# Patient Record
(demographics unavailable — no encounter records)

---

## 2024-10-16 NOTE — REVIEW OF SYSTEMS
[Visual Disturbances] : visual disturbances [Negative] : Neurological [Peripheral Sensory Neuropathy: Grade 1 - Asymptomatic; loss of deep tendon reflexes or paresthesia] : Peripheral Sensory Neuropathy: Grade 1 - Asymptomatic; loss of deep tendon reflexes or paresthesia [Dysphagia] : no dysphagia [FreeTextEntry3] : with macular degeneration RIGHT eye vision loss. Left eye vision intact [FreeTextEntry4] : denies neck swelling/arm swelling [de-identified] : Hands

## 2024-10-16 NOTE — HISTORY OF PRESENT ILLNESS
[FreeTextEntry1] : Rt hx: Ms. Christine underwent SRS to a left frontal brain metastasis on 1/12/17, gamma knife SRS on 10/25/17 to 4 lesions, and most recently left parietal and left thalamic lesion on 1/7/2021.    ONCOLOGY HISTORY Ms. 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 She underwent 45 Gy of radiation to the chest completed in January 2017. She underwent SRS to a left frontal brain metastasis on 1/12/17 and additional gamma knife SRS on 10/25/17 to 4 other brain metastases. She followed up on 12/20/17 and was doing well after a follow-up MRI on 12/11/17 demonstrated a good response with no new lesions.     In January 2021 she was seen by me for suspicion of two new brain metastases, asymptomatic.  There was concern that these could represent leptomeningeal enhancement.  At the time of initial consult she felt overall very well. reported a stress level "0/10." denied pain, denied headaches, nausea, vomiting focal weakness, numbness, tingling, seizures, balance trouble. She had not been on systemic therapy in 3 years. Followed regularly with Dr. Blake for brain MRIs every 6 months, and with Dr. Kumar at [facility] for body scans. These were treated with GK SRS.   4/7/2021- Ms. Christine presents today for follow up. She underwent a brain MRI prior to her visit today. There is no final read however there does not appear to be any new lesions and the treated lesions appear smaller. She feels overall well. Denies headaches, focal weakness, nausea, vomiting. Remains not on any systemic therapy.  8/9/2021- Ms. Christine presents today for follow up.  Clinically she is doing well, denying any new symptoms.  Brain MRI done 8/7/2021 showed resolution of the left internal capsule lesion and decrease in size of the left parietal cortex lesion.  3/1/2022: Pt presents for follow-up. MRI brain 2/25 with resolution of left parietal cortex lesion, no new mets. Doing well. Continues Keytruda. Notes mild numbness bilateral hands in mornings, goes away. She has been sleeping on her hands. Denies HA, N/V/D. Denies changes in hearing or speech. Denies dysphagia. H/o cataracts, seeing eye doctor next week. Having near-vision issues lately. No gait issues.   7/12/2022- Ms. Christine presents today for follow up. MRI done this morning. feeling well. started keytruda back in 11/2021 for recurrence in chest under the care of Dr. Bee. No headaches, no nausea, no focal weakness. has macular degeneration and following with ophtho.  11/22/2022- Ms. Page presents today for follow up. MRI done before visit today. PET scan 9/20/2022 showed Compared to FDG-PET/CT scan dated 5/9/2022: 1. Minimally FDG-avid right lower lobe lung nodule and right perihilar lymph nodes, not significantly changed. Small focus of residual disease is not excluded. 2. Remainder of study demonstrates no evidence of FDG-avid disease and no significant change. No new lesion.  Today she feels well overall. continues to follow with Dr. Bee. No headaches, no nausea, no focal weakness. on keytruda every 3 weeks.   3/15/2023 Ms. Christine presents for follow up. Brain MRI done today. HUNTER on our read. PET/CT on 3/9/2023 showed IMPRESSION: 1. No significant interval change. No new lesions. Stable scan compared to prior.  Visit dated 10/5/2023 patient presents for routine f/u and progress check with completed images for review. reports doing well. Reports doing well appetite is good and is w/o any swallowing difficulty. Denies N/V, HA/unilateral extremity weakness/memory changes/gait disturbance/bowel/bladder dysfunction or other neurologic symptoms. No issues with speech or comprehension. Vision loss RIGHT eye vision loss 2/2 macular degeneration  Continues follow with Dr Heather Santana on Keytruda  MRI brain w w/o contrast 10/4/2023 no final read available at time of this entry.  PETCt 9/22/23 IMPRESSION: Compared to FDG PET/CT dated 3/9/2023: 1. Two indeterminate small foci of mildly increased FDG activity in right perihilar region, likely corresponding to small lymph nodes, are similar, or minimally increased in metabolism. Consider 3 month follow-up with FDG PET/CT, for further evaluation. 2. Minimally FDG-avid right lower lobe nodular density, not significantly changed. 3. Small, minimally FDG-avid subcutaneous density, lateral aspect of upper right arm, decreased in size and metabolism, may be related to prior subcutaneous injection. Please correlate clinically, and with ultrasound, if indicated.   Visit dated 4/10/24 Patient returns for routine f/u and progress check with cranial images for review. Denies N/V, HA/unilateral extremity weakness/memory changes/gait disturbance/bowel/bladder dysfunction or other neurologic symptoms. No issues with speech or comprehension. Participates in activity as tolerated.  Continues follow with Dr. Fidel Santana on Keytruda every 3 weeks tolerating well.  MRI brain w w/o contrast 4/10/24 no final read available at time of this entry  PET ct 1/25/2024 IMPRESSION: Compared to FDG PET/CT dated 9/22/2023: 1. Two unchanged small FDG-avid foci in right perihilar region, likely corresponding to small lymph nodes which are nonspecific. 2. Minimally FDG-avid partially calcified right lower lobe pulmonary nodule is unchanged in size and metabolism. 3. Small FDG-avid intramuscular focus, right upper arm, not significantly changed. Please correlate clinically. 4. No new lesion.  VISIT DATED: 10/16/2024 Patient returns for routine f/u and progress eval with cranial images for review Reports doing well overall. Denies N+V, Headaches unilateral extremity weakness/memory changes/gait disturbance/bowel/bladder dysfunction or other neurologic symptoms. No issues with speech or comprehension. Baseline visual disturbance RIGHT 2/2 macular degen.  Continues follow with Dr. Fidel Santana on Keytruda every 3 weeks tolerating well. Per patient last PET scan ~ 2 months ago w/o POD.  MRI brain w w/o contrast 10/16/2024 no final read at time of this entry

## 2024-10-16 NOTE — HISTORY OF PRESENT ILLNESS
[FreeTextEntry1] : Rt hx: Ms. Christine underwent SRS to a left frontal brain metastasis on 1/12/17, gamma knife SRS on 10/25/17 to 4 lesions, and most recently left parietal and left thalamic lesion on 1/7/2021.    ONCOLOGY HISTORY Ms. 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 She underwent 45 Gy of radiation to the chest completed in January 2017. She underwent SRS to a left frontal brain metastasis on 1/12/17 and additional gamma knife SRS on 10/25/17 to 4 other brain metastases. She followed up on 12/20/17 and was doing well after a follow-up MRI on 12/11/17 demonstrated a good response with no new lesions.     In January 2021 she was seen by me for suspicion of two new brain metastases, asymptomatic.  There was concern that these could represent leptomeningeal enhancement.  At the time of initial consult she felt overall very well. reported a stress level "0/10." denied pain, denied headaches, nausea, vomiting focal weakness, numbness, tingling, seizures, balance trouble. She had not been on systemic therapy in 3 years. Followed regularly with Dr. Blake for brain MRIs every 6 months, and with Dr. Kumar at [facility] for body scans. These were treated with GK SRS.   4/7/2021- Ms. Christine presents today for follow up. She underwent a brain MRI prior to her visit today. There is no final read however there does not appear to be any new lesions and the treated lesions appear smaller. She feels overall well. Denies headaches, focal weakness, nausea, vomiting. Remains not on any systemic therapy.  8/9/2021- Ms. Christine presents today for follow up.  Clinically she is doing well, denying any new symptoms.  Brain MRI done 8/7/2021 showed resolution of the left internal capsule lesion and decrease in size of the left parietal cortex lesion.  3/1/2022: Pt presents for follow-up. MRI brain 2/25 with resolution of left parietal cortex lesion, no new mets. Doing well. Continues Keytruda. Notes mild numbness bilateral hands in mornings, goes away. She has been sleeping on her hands. Denies HA, N/V/D. Denies changes in hearing or speech. Denies dysphagia. H/o cataracts, seeing eye doctor next week. Having near-vision issues lately. No gait issues.   7/12/2022- Ms. Christine presents today for follow up. MRI done this morning. feeling well. started keytruda back in 11/2021 for recurrence in chest under the care of Dr. Bee. No headaches, no nausea, no focal weakness. has macular degeneration and following with ophtho.  11/22/2022- Ms. Page presents today for follow up. MRI done before visit today. PET scan 9/20/2022 showed Compared to FDG-PET/CT scan dated 5/9/2022: 1. Minimally FDG-avid right lower lobe lung nodule and right perihilar lymph nodes, not significantly changed. Small focus of residual disease is not excluded. 2. Remainder of study demonstrates no evidence of FDG-avid disease and no significant change. No new lesion.  Today she feels well overall. continues to follow with Dr. Bee. No headaches, no nausea, no focal weakness. on keytruda every 3 weeks.   3/15/2023 Ms. Christine presents for follow up. Brain MRI done today. HUNTER on our read. PET/CT on 3/9/2023 showed IMPRESSION: 1. No significant interval change. No new lesions. Stable scan compared to prior.  Visit dated 10/5/2023 patient presents for routine f/u and progress check with completed images for review. reports doing well. Reports doing well appetite is good and is w/o any swallowing difficulty. Denies N/V, HA/unilateral extremity weakness/memory changes/gait disturbance/bowel/bladder dysfunction or other neurologic symptoms. No issues with speech or comprehension. Vision loss RIGHT eye vision loss 2/2 macular degeneration  Continues follow with Dr Heather Santana on Keytruda  MRI brain w w/o contrast 10/4/2023 no final read available at time of this entry.  PETCt 9/22/23 IMPRESSION: Compared to FDG PET/CT dated 3/9/2023: 1. Two indeterminate small foci of mildly increased FDG activity in right perihilar region, likely corresponding to small lymph nodes, are similar, or minimally increased in metabolism. Consider 3 month follow-up with FDG PET/CT, for further evaluation. 2. Minimally FDG-avid right lower lobe nodular density, not significantly changed. 3. Small, minimally FDG-avid subcutaneous density, lateral aspect of upper right arm, decreased in size and metabolism, may be related to prior subcutaneous injection. Please correlate clinically, and with ultrasound, if indicated.   Visit dated 4/10/24 Patient returns for routine f/u and progress check with cranial images for review. Denies N/V, HA/unilateral extremity weakness/memory changes/gait disturbance/bowel/bladder dysfunction or other neurologic symptoms. No issues with speech or comprehension. Participates in activity as tolerated.  Continues follow with Dr. Fidel Santana on Keytruda every 3 weeks tolerating well.  MRI brain w w/o contrast 4/10/24 no final read available at time of this entry  PET ct 1/25/2024 IMPRESSION: Compared to FDG PET/CT dated 9/22/2023: 1. Two unchanged small FDG-avid foci in right perihilar region, likely corresponding to small lymph nodes which are nonspecific. 2. Minimally FDG-avid partially calcified right lower lobe pulmonary nodule is unchanged in size and metabolism. 3. Small FDG-avid intramuscular focus, right upper arm, not significantly changed. Please correlate clinically. 4. No new lesion.  VISIT DATED: 10/16/2024 Patient returns for routine f/u and progress eval with cranial images for review Reports doing well overall. Denies N+V, Headaches unilateral extremity weakness/memory changes/gait disturbance/bowel/bladder dysfunction or other neurologic symptoms. No issues with speech or comprehension. Baseline visual disturbance RIGHT 2/2 macular degen.  Continues follow with Dr. Fidel Santana on Keytruda every 3 weeks tolerating well. Per patient last PET scan ~ 2 months ago w/o POD.  MRI brain w w/o contrast 10/16/2024 no final read at time of this entry

## 2024-10-16 NOTE — PHYSICAL EXAM
[General Appearance - Well Nourished] : well nourished [Thin] : thin [Hearing Threshold Finger Rub Not [place]] : hearing was normal [] : no respiratory distress [Exaggerated Use Of Accessory Muscles For Inspiration] : no accessory muscle use [No Focal Deficits] : no focal deficits [Sensation] : the sensory exam was normal to light touch and pinprick [Motor Exam] : the motor exam was normal [Oriented To Time, Place, And Person] : oriented to person, place, and time [de-identified] : no neuro deficits

## 2024-10-16 NOTE — REVIEW OF SYSTEMS
[Visual Disturbances] : visual disturbances [Negative] : Neurological [Peripheral Sensory Neuropathy: Grade 1 - Asymptomatic; loss of deep tendon reflexes or paresthesia] : Peripheral Sensory Neuropathy: Grade 1 - Asymptomatic; loss of deep tendon reflexes or paresthesia [Dysphagia] : no dysphagia [FreeTextEntry3] : with macular degeneration RIGHT eye vision loss. Left eye vision intact [FreeTextEntry4] : denies neck swelling/arm swelling [de-identified] : Hands

## 2024-10-16 NOTE — PHYSICAL EXAM
[General Appearance - Well Nourished] : well nourished [Thin] : thin [Hearing Threshold Finger Rub Not [place]] : hearing was normal [] : no respiratory distress [Exaggerated Use Of Accessory Muscles For Inspiration] : no accessory muscle use [No Focal Deficits] : no focal deficits [Sensation] : the sensory exam was normal to light touch and pinprick [Motor Exam] : the motor exam was normal [Oriented To Time, Place, And Person] : oriented to person, place, and time [de-identified] : no neuro deficits

## 2024-10-16 NOTE — VITALS
[ECOG Performance Status: 0 - Fully active, able to carry on all pre-disease performance without restriction] : Performance Status: 0 - Fully active, able to carry on all pre-disease performance without restriction [Maximal Pain Intensity: 0/10] : 0/10 [Least Pain Intensity: 0/10] : 0/10 [80: Normal activity with effort; some signs or symptoms of disease.] : 80: Normal activity with effort; some signs or symptoms of disease.

## 2024-11-26 NOTE — PAST MEDICAL HISTORY
[Malignancy] : Malignancy [No therapy indicated for cases scheduled for less than one hour] : No therapy indicated for cases scheduled for less than one hour. [FreeTextEntry1] : Malignant Hyperthermia Screening Tool and Risk of Bleeding Assessment  Ms. HANSA VARGAS denies family history of unexpected death following Anesthesia or Exercise. Denies Family history of Malignant Hyperthermia, Muscle or Neuromuscular disorder and High Temperature following exercise.  Ms. HANSA VARGAS denies history of Muscle Spasm, Dark or Chocolate - Colored urine and Unanticipated fever immediately following anesthesia or serious exercise.  Ms. VARGAS also denies bleeding tendencies/ Risks of Bleeding.

## 2024-11-26 NOTE — PROCEDURE
[D/C IV on discharge] : D/C IV on discharge [Resume diet] : resume diet [Site check for bleeding/hematoma] : Site check for bleeding/hematoma [Vital signs on admission the q 15 mins x2] : Vital signs on admission the q 15 mins x2 [FreeTextEntry1] : mediport check

## 2024-11-27 NOTE — HISTORY OF PRESENT ILLNESS
[FreeTextEntry1] : accompanied by  Sha [phone] alert  pt forgetful  no medications taken this morning  oncologist Dr Romero   [FreeTextEntry5] : yesterday at 7pm  [FreeTextEntry6] : Dr Acevedo

## 2024-11-27 NOTE — PROCEDURE
[D/C IV on discharge] : D/C IV on discharge [Resume diet] : resume diet [Discharged at: ____] : Discharged at [unfilled] [Site check for bleeding/hematoma] : Site check for bleeding/hematoma [Vital signs on admission the q 15 mins x2] : Vital signs on admission the q 15 mins x2 [FreeTextEntry1] : Right femoral mediport exchange

## 2024-11-27 NOTE — HISTORY OF PRESENT ILLNESS
[FreeTextEntry1] : accompanied by  Sha [phone] alert  pt forgetful  no medications taken this morning  oncologist Dr Romero   [FreeTextEntry5] : yesterday at 7pm  [FreeTextEntry6] : Dr Acevedo

## 2024-11-27 NOTE — HISTORY OF PRESENT ILLNESS
[FreeTextEntry1] : accompanied by  Sha [phone] alert  pt forgetful  no medications taken this morning  oncologist Dr Romero   [FreeTextEntry5] : yesterday at 7pm  [FreeTextEntry6] : Dr Acevedo

## 2024-11-27 NOTE — PAST MEDICAL HISTORY
[Increasing age ( >40 years old)] : Increasing age ( >40 years old) [Malignancy] : Malignancy [No therapy indicated for cases scheduled for less than one hour] : No therapy indicated for cases scheduled for less than one hour. [FreeTextEntry1] : Malignant Hyperthermia Screening Tool and Risk of Bleeding Assessment   HANSA VARGAS  denies family of unexpected death following Anesthesia or Exercise. Denies family history of Malignant Hyperthermia, Muscle or Neuromuscular disorder and High Temperature following exercise.   HANSA SALASRSKI Patient denies history of Muscle Spasm, Dark or Chocolate- Colored and unanticipated fever immediately following anesthesia or serious exercise.   HANSA SALASRSKI Patient also denies bleeding tendencies/risks of bleeding.

## 2025-04-14 NOTE — HISTORY OF PRESENT ILLNESS
[FreeTextEntry1] : RT hx: Ms. Christine underwent SRS to a left frontal brain metastasis on 1/12/17, gamma knife SRS on 10/25/17 to 4 lesions, and most recently left parietal and left thalamic lesion on 1/7/2021.    ONCOLOGY HISTORY Ms. 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 She underwent 45 Gy of radiation to the chest completed in January 2017. She underwent SRS to a left frontal brain metastasis on 1/12/17 and additional gamma knife SRS on 10/25/17 to 4 other brain metastases. She followed up on 12/20/17 and was doing well after a follow-up MRI on 12/11/17 demonstrated a good response with no new lesions.     In January 2021 she was seen by me for suspicion of two new brain metastases, asymptomatic.  There was concern that these could represent leptomeningeal enhancement.  At the time of initial consult she felt overall very well. reported a stress level "0/10." denied pain, denied headaches, nausea, vomiting focal weakness, numbness, tingling, seizures, balance trouble. She had not been on systemic therapy in 3 years. Followed regularly with Dr. Blake for brain MRIs every 6 months, and with Dr. Kumar at [facility] for body scans. These were treated with GK SRS.   4/7/2021- Ms. Christine presents today for follow up. She underwent a brain MRI prior to her visit today. There is no final read however there does not appear to be any new lesions and the treated lesions appear smaller. She feels overall well. Denies headaches, focal weakness, nausea, vomiting. Remains not on any systemic therapy.  8/9/2021- Ms. Christine presents today for follow up.  Clinically she is doing well, denying any new symptoms.  Brain MRI done 8/7/2021 showed resolution of the left internal capsule lesion and decrease in size of the left parietal cortex lesion.  3/1/2022: Pt presents for follow-up. MRI brain 2/25 with resolution of left parietal cortex lesion, no new mets. Doing well. Continues Keytruda. Notes mild numbness bilateral hands in mornings, goes away. She has been sleeping on her hands. Denies HA, N/V/D. Denies changes in hearing or speech. Denies dysphagia. H/o cataracts, seeing eye doctor next week. Having near-vision issues lately. No gait issues.   7/12/2022- Ms. Christine presents today for follow up. MRI done this morning. feeling well. started keytruda back in 11/2021 for recurrence in chest under the care of Dr. Bee. No headaches, no nausea, no focal weakness. has macular degeneration and following with ophtho.  11/22/2022- Ms. Page presents today for follow up. MRI done before visit today. PET scan 9/20/2022 showed Compared to FDG-PET/CT scan dated 5/9/2022: 1. Minimally FDG-avid right lower lobe lung nodule and right perihilar lymph nodes, not significantly changed. Small focus of residual disease is not excluded. 2. Remainder of study demonstrates no evidence of FDG-avid disease and no significant change. No new lesion.  Today she feels well overall. continues to follow with Dr. Bee. No headaches, no nausea, no focal weakness. on keytruda every 3 weeks.   3/15/2023 Ms. Christine presents for follow up. Brain MRI done today. HUNTER on our read. PET/CT on 3/9/2023 showed IMPRESSION: 1. No significant interval change. No new lesions. Stable scan compared to prior.  Visit dated 10/5/2023 patient presents for routine f/u and progress check with completed images for review. reports doing well. Reports doing well appetite is good and is w/o any swallowing difficulty. Denies N/V, HA/unilateral extremity weakness/memory changes/gait disturbance/bowel/bladder dysfunction or other neurologic symptoms. No issues with speech or comprehension. Vision loss RIGHT eye vision loss 2/2 macular degeneration  Continues follow with Dr Heather Santana on Keytruda  MRI brain w w/o contrast 10/4/2023 no final read available at time of this entry.  PETCt 9/22/23 IMPRESSION: Compared to FDG PET/CT dated 3/9/2023: 1. Two indeterminate small foci of mildly increased FDG activity in right perihilar region, likely corresponding to small lymph nodes, are similar, or minimally increased in metabolism. Consider 3 month follow-up with FDG PET/CT, for further evaluation. 2. Minimally FDG-avid right lower lobe nodular density, not significantly changed. 3. Small, minimally FDG-avid subcutaneous density, lateral aspect of upper right arm, decreased in size and metabolism, may be related to prior subcutaneous injection. Please correlate clinically, and with ultrasound, if indicated.   Visit dated 4/10/24 Patient returns for routine f/u and progress check with cranial images for review. Denies N/V, HA/unilateral extremity weakness/memory changes/gait disturbance/bowel/bladder dysfunction or other neurologic symptoms. No issues with speech or comprehension. Participates in activity as tolerated.  Continues follow with Dr. Fidel Santana on Keytruda every 3 weeks tolerating well.  MRI brain w w/o contrast 4/10/24 no final read available at time of this entry  PET ct 1/25/2024 IMPRESSION: Compared to FDG PET/CT dated 9/22/2023: 1. Two unchanged small FDG-avid foci in right perihilar region, likely corresponding to small lymph nodes which are nonspecific. 2. Minimally FDG-avid partially calcified right lower lobe pulmonary nodule is unchanged in size and metabolism. 3. Small FDG-avid intramuscular focus, right upper arm, not significantly changed. Please correlate clinically. 4. No new lesion.  VISIT DATED: 10/16/2024 Patient returns for routine f/u and progress eval with cranial images for review Reports doing well overall. Denies N+V, Headaches unilateral extremity weakness/memory changes/gait disturbance/bowel/bladder dysfunction or other neurologic symptoms. No issues with speech or comprehension. Baseline visual disturbance RIGHT 2/2 macular degen.  Continues follow with Dr. Fidel Santana on Keytruda every 3 weeks tolerating well. Per patient last PET scan ~ 2 months ago w/o POD.  MRI brain w w/o contrast 10/16/2024 no final read at time of this entry  VISIT DATED: 4/1/62025 Ms. Christine presents for routine followup and progress check with cranial images for review. States  Continues follow with Dr. Fidel Santana on Keytruda every 3 weeks tolerating well.???  MRI brain w w/o contrast 4/11/2025 ????

## 2025-04-16 NOTE — REVIEW OF SYSTEMS
[Visual Disturbances] : visual disturbances [Dysphagia] : no dysphagia [Negative] : Neurological [FreeTextEntry3] : with macular degeneration RIGHT eye vision loss. Left eye vision intact [FreeTextEntry4] : denies neck swelling/arm swelling

## 2025-04-16 NOTE — HISTORY OF PRESENT ILLNESS
[FreeTextEntry1] : RT hx: Ms. Christine underwent SRS to a left frontal brain metastasis on 1/12/17, gamma knife SRS on 10/25/17 to 4 lesions, and most recently left parietal and left thalamic lesion on 1/7/2021.    ONCOLOGY HISTORY Ms. 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 She underwent 45 Gy of radiation to the chest completed in January 2017. She underwent SRS to a left frontal brain metastasis on 1/12/17 and additional gamma knife SRS on 10/25/17 to 4 other brain metastases. She followed up on 12/20/17 and was doing well after a follow-up MRI on 12/11/17 demonstrated a good response with no new lesions.     In January 2021 she was seen by me for suspicion of two new brain metastases, asymptomatic.  There was concern that these could represent leptomeningeal enhancement.  At the time of initial consult she felt overall very well. reported a stress level "0/10." denied pain, denied headaches, nausea, vomiting focal weakness, numbness, tingling, seizures, balance trouble. She had not been on systemic therapy in 3 years. Followed regularly with Dr. Blake for brain MRIs every 6 months, and with Dr. Kumar at [facility] for body scans. These were treated with GK SRS.   4/7/2021- Ms. Christine presents today for follow up. She underwent a brain MRI prior to her visit today. There is no final read however there does not appear to be any new lesions and the treated lesions appear smaller. She feels overall well. Denies headaches, focal weakness, nausea, vomiting. Remains not on any systemic therapy.  8/9/2021- Ms. Christine presents today for follow up.  Clinically she is doing well, denying any new symptoms.  Brain MRI done 8/7/2021 showed resolution of the left internal capsule lesion and decrease in size of the left parietal cortex lesion.  3/1/2022: Pt presents for follow-up. MRI brain 2/25 with resolution of left parietal cortex lesion, no new mets. Doing well. Continues Keytruda. Notes mild numbness bilateral hands in mornings, goes away. She has been sleeping on her hands. Denies HA, N/V/D. Denies changes in hearing or speech. Denies dysphagia. H/o cataracts, seeing eye doctor next week. Having near-vision issues lately. No gait issues.   7/12/2022- Ms. Christine presents today for follow up. MRI done this morning. feeling well. started keytruda back in 11/2021 for recurrence in chest under the care of Dr. Bee. No headaches, no nausea, no focal weakness. has macular degeneration and following with ophtho.  11/22/2022- Ms. Page presents today for follow up. MRI done before visit today. PET scan 9/20/2022 showed Compared to FDG-PET/CT scan dated 5/9/2022: 1. Minimally FDG-avid right lower lobe lung nodule and right perihilar lymph nodes, not significantly changed. Small focus of residual disease is not excluded. 2. Remainder of study demonstrates no evidence of FDG-avid disease and no significant change. No new lesion.  Today she feels well overall. continues to follow with Dr. Bee. No headaches, no nausea, no focal weakness. on keytruda every 3 weeks.   3/15/2023 Ms. Christine presents for follow up. Brain MRI done today. HUNTER on our read. PET/CT on 3/9/2023 showed IMPRESSION: 1. No significant interval change. No new lesions. Stable scan compared to prior.  Visit dated 10/5/2023 patient presents for routine f/u and progress check with completed images for review. reports doing well. Reports doing well appetite is good and is w/o any swallowing difficulty. Denies N/V, HA/unilateral extremity weakness/memory changes/gait disturbance/bowel/bladder dysfunction or other neurologic symptoms. No issues with speech or comprehension. Vision loss RIGHT eye vision loss 2/2 macular degeneration  Continues follow with Dr Heather Santana on Keytruda  MRI brain w w/o contrast 10/4/2023 no final read available at time of this entry.  PETCt 9/22/23 IMPRESSION: Compared to FDG PET/CT dated 3/9/2023: 1. Two indeterminate small foci of mildly increased FDG activity in right perihilar region, likely corresponding to small lymph nodes, are similar, or minimally increased in metabolism. Consider 3 month follow-up with FDG PET/CT, for further evaluation. 2. Minimally FDG-avid right lower lobe nodular density, not significantly changed. 3. Small, minimally FDG-avid subcutaneous density, lateral aspect of upper right arm, decreased in size and metabolism, may be related to prior subcutaneous injection. Please correlate clinically, and with ultrasound, if indicated.   Visit dated 4/10/24 Patient returns for routine f/u and progress check with cranial images for review. Denies N/V, HA/unilateral extremity weakness/memory changes/gait disturbance/bowel/bladder dysfunction or other neurologic symptoms. No issues with speech or comprehension. Participates in activity as tolerated.  Continues follow with Dr. Fidel Santana on Keytruda every 3 weeks tolerating well.  MRI brain w w/o contrast 4/10/24 no final read available at time of this entry  PET ct 1/25/2024 IMPRESSION: Compared to FDG PET/CT dated 9/22/2023: 1. Two unchanged small FDG-avid foci in right perihilar region, likely corresponding to small lymph nodes which are nonspecific. 2. Minimally FDG-avid partially calcified right lower lobe pulmonary nodule is unchanged in size and metabolism. 3. Small FDG-avid intramuscular focus, right upper arm, not significantly changed. Please correlate clinically. 4. No new lesion.  VISIT DATED: 10/16/2024 Patient returns for routine f/u and progress eval with cranial images for review Reports doing well overall. Denies N+V, Headaches unilateral extremity weakness/memory changes/gait disturbance/bowel/bladder dysfunction or other neurologic symptoms. No issues with speech or comprehension. Baseline visual disturbance RIGHT 2/2 macular degen.  Continues follow with Dr. Fidel Santana on Keytruda every 3 weeks tolerating well. Per patient last PET scan ~ 2 months ago w/o POD.  MRI brain w w/o contrast 10/16/2024 no final read at time of this entry  VISIT DATED: 4/1/62025 Ms. Christine presents for routine follow-up and progress check with cranial images for review. States she is overall doing well with no new concerns. Baseline visual disturbance RIGHT eye d/t macular degeneration. No verbalized concerns today.  Continues follow with Dr. Fidel Santana on Keytruda every 3 weeks and continues to tolerate well. No POD on recent PETct   MRI brain w w/o contrast 4/11/2025 IMPRESSION: No evidence of a mass, abnormal enhancement, or current evidence of acute ischemia. Mild chronic white matter ischemic changes. No significant interval change from prior.

## 2025-04-16 NOTE — PHYSICAL EXAM
[General Appearance - Alert] : alert [No Focal Deficits] : no focal deficits [Oriented To Time, Place, And Person] : oriented to person, place, and time [Thin] : thin [] : no respiratory distress [Exaggerated Use Of Accessory Muscles For Inspiration] : no accessory muscle use [de-identified] : no deficits appreciated

## 2025-04-16 NOTE — PHYSICAL EXAM
[General Appearance - Alert] : alert [No Focal Deficits] : no focal deficits [Oriented To Time, Place, And Person] : oriented to person, place, and time [Thin] : thin [] : no respiratory distress [Exaggerated Use Of Accessory Muscles For Inspiration] : no accessory muscle use [de-identified] : no deficits appreciated

## 2025-04-16 NOTE — HISTORY OF PRESENT ILLNESS
[FreeTextEntry1] : RT hx: Ms. Christine underwent SRS to a left frontal brain metastasis on 1/12/17, gamma knife SRS on 10/25/17 to 4 lesions, and most recently left parietal and left thalamic lesion on 1/7/2021.    ONCOLOGY HISTORY Ms. 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 She underwent 45 Gy of radiation to the chest completed in January 2017. She underwent SRS to a left frontal brain metastasis on 1/12/17 and additional gamma knife SRS on 10/25/17 to 4 other brain metastases. She followed up on 12/20/17 and was doing well after a follow-up MRI on 12/11/17 demonstrated a good response with no new lesions.     In January 2021 she was seen by me for suspicion of two new brain metastases, asymptomatic.  There was concern that these could represent leptomeningeal enhancement.  At the time of initial consult she felt overall very well. reported a stress level "0/10." denied pain, denied headaches, nausea, vomiting focal weakness, numbness, tingling, seizures, balance trouble. She had not been on systemic therapy in 3 years. Followed regularly with Dr. Blake for brain MRIs every 6 months, and with Dr. Kumar at [facility] for body scans. These were treated with GK SRS.   4/7/2021- Ms. Christine presents today for follow up. She underwent a brain MRI prior to her visit today. There is no final read however there does not appear to be any new lesions and the treated lesions appear smaller. She feels overall well. Denies headaches, focal weakness, nausea, vomiting. Remains not on any systemic therapy.  8/9/2021- Ms. Christine presents today for follow up.  Clinically she is doing well, denying any new symptoms.  Brain MRI done 8/7/2021 showed resolution of the left internal capsule lesion and decrease in size of the left parietal cortex lesion.  3/1/2022: Pt presents for follow-up. MRI brain 2/25 with resolution of left parietal cortex lesion, no new mets. Doing well. Continues Keytruda. Notes mild numbness bilateral hands in mornings, goes away. She has been sleeping on her hands. Denies HA, N/V/D. Denies changes in hearing or speech. Denies dysphagia. H/o cataracts, seeing eye doctor next week. Having near-vision issues lately. No gait issues.   7/12/2022- Ms. Christine presents today for follow up. MRI done this morning. feeling well. started keytruda back in 11/2021 for recurrence in chest under the care of Dr. Bee. No headaches, no nausea, no focal weakness. has macular degeneration and following with ophtho.  11/22/2022- Ms. Page presents today for follow up. MRI done before visit today. PET scan 9/20/2022 showed Compared to FDG-PET/CT scan dated 5/9/2022: 1. Minimally FDG-avid right lower lobe lung nodule and right perihilar lymph nodes, not significantly changed. Small focus of residual disease is not excluded. 2. Remainder of study demonstrates no evidence of FDG-avid disease and no significant change. No new lesion.  Today she feels well overall. continues to follow with Dr. Bee. No headaches, no nausea, no focal weakness. on keytruda every 3 weeks.   3/15/2023 Ms. Christine presents for follow up. Brain MRI done today. HUNTER on our read. PET/CT on 3/9/2023 showed IMPRESSION: 1. No significant interval change. No new lesions. Stable scan compared to prior.  Visit dated 10/5/2023 patient presents for routine f/u and progress check with completed images for review. reports doing well. Reports doing well appetite is good and is w/o any swallowing difficulty. Denies N/V, HA/unilateral extremity weakness/memory changes/gait disturbance/bowel/bladder dysfunction or other neurologic symptoms. No issues with speech or comprehension. Vision loss RIGHT eye vision loss 2/2 macular degeneration  Continues follow with Dr Heather Santana on Keytruda  MRI brain w w/o contrast 10/4/2023 no final read available at time of this entry.  PETCt 9/22/23 IMPRESSION: Compared to FDG PET/CT dated 3/9/2023: 1. Two indeterminate small foci of mildly increased FDG activity in right perihilar region, likely corresponding to small lymph nodes, are similar, or minimally increased in metabolism. Consider 3 month follow-up with FDG PET/CT, for further evaluation. 2. Minimally FDG-avid right lower lobe nodular density, not significantly changed. 3. Small, minimally FDG-avid subcutaneous density, lateral aspect of upper right arm, decreased in size and metabolism, may be related to prior subcutaneous injection. Please correlate clinically, and with ultrasound, if indicated.   Visit dated 4/10/24 Patient returns for routine f/u and progress check with cranial images for review. Denies N/V, HA/unilateral extremity weakness/memory changes/gait disturbance/bowel/bladder dysfunction or other neurologic symptoms. No issues with speech or comprehension. Participates in activity as tolerated.  Continues follow with Dr. Fidel Santana on Keytruda every 3 weeks tolerating well.  MRI brain w w/o contrast 4/10/24 no final read available at time of this entry  PET ct 1/25/2024 IMPRESSION: Compared to FDG PET/CT dated 9/22/2023: 1. Two unchanged small FDG-avid foci in right perihilar region, likely corresponding to small lymph nodes which are nonspecific. 2. Minimally FDG-avid partially calcified right lower lobe pulmonary nodule is unchanged in size and metabolism. 3. Small FDG-avid intramuscular focus, right upper arm, not significantly changed. Please correlate clinically. 4. No new lesion.  VISIT DATED: 10/16/2024 Patient returns for routine f/u and progress eval with cranial images for review Reports doing well overall. Denies N+V, Headaches unilateral extremity weakness/memory changes/gait disturbance/bowel/bladder dysfunction or other neurologic symptoms. No issues with speech or comprehension. Baseline visual disturbance RIGHT 2/2 macular degen.  Continues follow with Dr. Fidel Santana on Keytruda every 3 weeks tolerating well. Per patient last PET scan ~ 2 months ago w/o POD.  MRI brain w w/o contrast 10/16/2024 no final read at time of this entry  VISIT DATED: 4/1/62025 Ms. Christine presents for routine follow-up and progress check with cranial images for review. States she is overall doing well with no new concerns. Baseline visual disturbance RIGHT eye d/t macular degeneration. No verbalized concerns today.  Continues follow with Dr. Fidel Santana on Keytruda every 3 weeks and continues to tolerate well. No POD on recent PETct   MRI brain w w/o contrast 4/11/2025 IMPRESSION: No evidence of a mass, abnormal enhancement, or current evidence of acute ischemia. Mild chronic white matter ischemic changes. No significant interval change from prior.